# Patient Record
(demographics unavailable — no encounter records)

---

## 2017-09-27 NOTE — CON
DATE OF CONSULTATION:  09/27/2017

 

REASON FOR CONSULTATION:  Tachycardia, increased troponin level.

 

HISTORY OF PRESENT ILLNESS:  Mr. Vance Wing is a gentleman who has been having lightheaded episode
s for 2 days.  Yesterday, he felt lightheaded for a relatively prolonged time at rest, but that reso
lved, but today he noticed when he tried to get up and around, he felt extremely lightheaded.  He we
nt ahead and went to work and he went to go see the nurse, but she was not there.  He [facility] himself 
[facility].  There his blood pressure was slow, they could not measure it and his hea
rt rate was so fast, it could be counted.  He was sent to the emergency room where his heart rate wa
s 220 beats a minute.  He was given adenosine intravenously which has slowed down the rate and looke
d like it probably atrial flutter with 1:1 conduction.  The heart rate went up at least 240 beats a 
minute and later converted to sinus rhythm but did have some transient atrial fibrillation as well.

 

The patient is currently asymptomatic, doing well.

 

The patient states he wants to go home now.

 

He did not have chest pain or tightness with this.

 

PAST MEDICAL HISTORY:  Negative for any cardiac problems other than high blood pressure.  Otherwise,
 the patient has been active and healthy, continuing to work.

 

MEDICATIONS:  In home including hydrochlorothiazide 25 mg a day, metoprolol XL 50 mg a day.

 

Patient does continue to smoke as mentioned.

 

SOCIAL HISTORY:  Positive for smoking.

 

FAMILY HISTORY:  Father had heart disease and coronary disease at a relatively young age.

 

REVIEW OF SYSTEMS:

CONSTITUTIONAL:  No significant weight gain or loss.

VISION:  No changes.

HEARING:  No changes.

PULMONARY:  No cough or wheezing.

GASTROINTESTINAL:  No nausea, vomiting, diarrhea.

SKIN:  No rashes.

NEUROLOGIC:  No unilateral weakness or numbness.

PSYCHIATRIC:  No unusual depression or anxiety.

HEMATOLOGIC:  No unusual bruising.

GENITOURINARY:  No burning with urination.

 

PHYSICAL EXAMINATION:

GENERAL:  He is a pleasant, healthy appearing middle-aged man, in no distress, wants to go home.

VITAL SIGNS:  Blood pressure high 194/95, pulse 58 regular.

LUNGS:  He has some expiratory wheezing.

CARDIAC:  Normal S1, normal S2.

ABDOMEN:  Soft, nontender.

EXTREMITIES:  No clubbing or cyanosis.  There is no edema.

 

PERTINENT LABORATORY:  Creatinine is normal.  Troponin went to a 0.032 to 0.558 to 1.123.

 

EKGs revealed narrow complex tachycardia, one with rate of 220, but it accelerates up to 240 at one 
time, later he had some atrial fibrillation with a rate of 140.

 

ASSESSMENT:

1.  Supraventricular tachycardia, possibly atrial flutter.

2.  Paroxysmal atrial fibrillation.

3.  Echo is normal.

4.  Hypertension.

 

PLAN:

1.  Consulted Dr. Zhong, consideration for an ablation.

2.  We will hold off on beta-blockers due to the wheezing.

3.  Stress testing to be done, hopefully done tomorrow morning.  Hopefully, he can still have the fl
utter ablation in the afternoon.

## 2017-09-27 NOTE — HP
PRIMARY CARE PHYSICIAN:  Edison Virgen M.D.

 

CHIEF COMPLAINT:  Feeling dizzy and sweating.

 

HISTORY OF PRESET ILLNESS:  Mr. Wing is a pleasant 53-year-old gentleman that has a history of hyp
ertension.  He was in his usual state of health until yesterday.  He says that when he got up in the
 morning, he felt dizzy, sweaty, and little bit lightheaded.  He says that he laid down and put a co
ld rag over his head and says after about 5 or 10 minutes, the feeling subsided and he says then he 
got up, got dressed and went to work and had no other problems.  He says then again this morning whe
n he woke up, he once again felt dizzy and a bit lightheaded and felt like his heart was racing.  He
 said he did the same thing, he laid down and tried to be still for a while; however, the feeling elon
bsided, but did not go away completely.  He went ahead and got dressed and went to work, however.  KILO salmon says that when he got to work, he was feeling bad and his symptoms got progressively worse and wor
se.  He went to see the nurse on duty, but she was not there.  He says that he began feeling like he
 was going to pass out, so he drove himself home and he says that he and sounds like his wife went t
o the [facility].  He says that his blood pressure was undetectable, so they sent him t
o the [place] emergency room in [place].  There they did an EKG and found that his heart 
rate was around 220.  They gave him \\"some of medication which was adenosine\\" and says that after
 that he began to feel much better and in fact now, he feels back to his normal self.  During this t
chiquita, he did complain of some tightness in his chest and some soreness and numbness in his left arm. 
 He denies having any shortness of breath, no nausea, or vomiting.  He says that the episode today l
asted about 3 hours.  It is started about 5 a.m. when he got up and continued until about 8 a.m. in 
the morning.

 

REVIEW OF SYSTEM:  Constitutional:  There has been no fever, chills, no night sweats, no weight loss
.  HEENT:  He has had no headache, but he has had dizziness.  No visual changes, no sore throat, rhi
norrhea, neck pain, no adenopathy.  Pulmonary:  No hemoptysis, no cough, no wheezing.  Cardiovascula
r:  As the history of present illness.  Gastrointestinal:  No abdominal pain, no nausea, no vomiting
, no change in bowels.  Genitourinary:  No urinary frequency, hematuria, no hesitancy.  Neurologic: 
 No focal weakness, numbness, no seizures.  Psychiatric:  No symptoms of anxiety or depression.  Ski
n and Integument:  No skin changes.  No rash.

 

PAST MEDICAL HISTORY:  Significant for hypertension.

 

PAST SURGICAL HISTORY:  Negative.

 

ALLERGIES:  No known drug allergies.

 

FAMILY HISTORY:  He says his father has everything \\"including heart disease, diabetes mellitus and
 cerebrovascular disease.\\"

 

SOCIAL HISTORY:  He says he drinks anywhere from 5 to 8 beers a day.  He smokes 2 packs a day since 
he was 20 and therefore, it has been 30 years.  He is  and has 3 children.

 

MEDICATIONS:  Include metoprolol extended release 50 mg daily and hydrochlorothiazide 25 mg a day.

 

PHYSICAL EXAMINATION:

GENERAL:  Currently he is alert and oriented.  He appears to be in no acute distress.

VITAL SIGNS:  Blood pressure is 175/97, heart rate 64, respiratory rate of 18, temperature is 97.8.

HEENT:  Pupils are equal, round, and reactive.  Extraocular muscles are intact.  Sclerae are anicter
ic.  Throat:  No erythema, no exudates.

NECK:  No adenopathy, no bruits.

LUNGS:  Clear to auscultation.  There was no wheezing, no rales.

CARDIOVASCULAR:  He has a normal S1 and S2.  There is no S3 or S4.  No murmurs, clicks, no rubs.

ABDOMEN:  Soft, nontender, nondistended.  Positive for bowel sound.  There is no rebound or guarding
.

EXTREMITIES:  There is no clubbing, cyanosis, no edema.

NEUROLOGIC:  Nonfocal.

 

LABORATORY RESULTS:  White blood cell count is 9.8, hemoglobin 16, hematocrit is 44.3, platelet coun
t is 252.  INR 0.9.  Sodium 138, potassium 4.5, chloride is 101, CO2 is 25, BUN is 18, creatinine 1.
08, glucose is 151, calcium is 10.7.  AST is 38, ALT is 58, and troponin is 0.032 initially and then
 0.558.

 

IMAGING:  EKG initially was appeared to be sinus tachycardia at the rate of 220; after adenosine was
 given, his EKG with atrial fibrillation with a heart rate of 155.  He was placed on Cardizem for a 
short while and is now sinus rhythm at the rate of 68 and he has some nonspecific ST wave changes.

 

ASSESSMENT:

1. This is a 53-year-old gentleman that presented to the emergency room with dizziness and sweating 
as well as some chest tightness.  He was found to be initially supraventricular tachycardia and then
 atrial fibrillation.  It also noted that he has elevated troponin which is more than quadruple sinc
e his admission.  Due to the patient's heavy smoking history and he also has another risk factor inc
luding family history and the fact that he is a male over 50, it is possible that this could be an a
cute coronary syndrome resulting into the arrhythmia or it could be that the patient has a primary a
rrhythmia and causing a demand ischemia; however, at this point it is impossible to tell.  He will b
e admitted and started on nitrates as well as aspirin and will place him on low dose beta blocker.  
Cardiology will be consulted to determine the etiology of his heart disease.  I suspect he will need
 an echo possibly cardiac catheterization as well.

2.  For his tobacco abuse, he has been instructed briefly on smoking cessation.  I offered Wellbutri
n; however, he refused this and says he can take care of this on his own.  Given the fact that he is
 possibly in an acute coronary syndrome, we will hold off on nicotine replacement at this time.

3.  The patient's blood pressure is uncontrolled.  It is unclear whether or not his current blood pr
essure medication is sufficient.  We will continue to monitor his trend and we likely may need to ad
d an additional antihypertensive as well.

## 2017-09-27 NOTE — RAD
SINGLE VIEW OF THE CHEST:

 

Comparison: None.

 

History: Chest pain, lightheadedness. 

 

FINDINGS:

Single view of the chest shows a normal sized cardiomediastinal silhouette. There is no evidence of 
consolidation, mass, or pleural effusion. The bones are unremarkable.

 

IMPRESSION:

No evidence of acute cardiopulmonary disease.

 

POS: [facility]

## 2017-09-28 NOTE — CON
DATE OF CONSULTATION:  09/27/2017

 

ELECTROPHYSIOLOGY CONSULTATION REPORT

 

REFERRING PHYSICIAN:  Vero Tom M.D.

 

I am seeing Mr. Wing at our [facility] telemetry floor as an 
electrophysiology consultant.  His problems are:

1.  Presentation with rapid narrow complex tachycardia with adenosine response 
consistent with typical atrial flutter with intermittent 1:1 conduction.

A.  Short run of atrial fibrillation also seen with subsequent conversion to 
sinus rhythm.

2.  A 2D echo from 09/27/2017 reveals LVEF 50 to 55%.

3.  Elevated troponin up to 1.123 noted.

4.  Coronary artery risk factors.

A.  History of smoking.

 

ALLERGIES:  None noted.

 

MEDICATIONS:  At home including hydrochlorothiazide, metoprolol succinate 50 mg 
daily.

 

SUBJECTIVE:  Mr. Wing is here with severe dizziness, lightheadedness near 
syncope, extreme weakness.  In the ER, he was evaluated and found to be in a 
rapid narrow complex tachycardia, adenosine since I just slowed him down and 
then the tachycardia recurred and underlying flutter waves were observed during 
adenosine administration.  He also did convert to atrial fibrillation, but 
eventually, then the rhythm was terminated.

 

He does not have bleeding issues, no true angina-like symptoms.  No fevers, 
chills, cough, rest of the 12-point review of systems unremarkable.

 

PAST MEDICAL HISTORY:  As above.  No prior history of heart disease or heart 
attacks.  He was treated for hypertension though.

 

SOCIAL HISTORY:  The patient is a smoker.  He denies ETOH or drug use.  He 
works as a manager in a company.

 

FAMILY HISTORY:  Noncontributory.

 

OBJECTIVE DATA:

VITAL SIGNS:  Blood pressure is markedly hypertensive, initially 159/120 mostly 
it is about 191/105.

GENERAL:  He is an alert and oriented man in no apparent distress.

NECK:  Supple.  Jugular veins not distended.

CHEST:  Coarse without crackles.

CARDIOVASCULAR:  Heart sounds are regular to rate and rhythm.  No murmur or 
gallop.

ABDOMEN:  Benign.  Bowel sounds positive.

EXTREMITIES:  Lower extremities without edema, clubbing or cyanosis.  Pulses 
are adequate.

NEUROLOGIC:  The patient is nonfocal.

MUSCULOSKELETAL:  No joint swelling or deformities.

SKIN:  Without rash.

 

DATABASE:  The initial presenting EKG is a rapid narrow complex SVT at rate of 
218 beats per minute.  Underlying flutter waves are visible.  ST depression is 
seen in the inferolateral leads.  Subsequent EKG and rhythm strips during 
adenosine reveal slowing of the heart rates with underlying flutter waves, 
which is likely typical in morphology noted after 6 mg of adenosine.  The 
atrial flutter becomes 2:1, and then rapid rate occurs in 1:1 conduction.  
Variable heart rates are seen.  There is an EKG with variable AV conduction 
with possible underlying flutter wave still present, cannot rule out atrial 
fibrillation at this time.  The fourth EKG reveals sinus rhythm with rate of 68 
beats per minute with nonspecific ST-T changes.

 

LABORATORY DATA:  Sodium 138, potassium 4.5, BUN 18, creatinine is 1.08.  AST 
and ALT is 38 and 58.  Troponin I is 0.32, 0.558 and 1.123.  Globulin is 3.8.  
White count is 9.8, hemoglobin 16, platelet count is 252.

 

ASSESSMENT AND PLAN:  Mr. Wing is a 53-year-old man with a prior history of 
heart disease who presented with lightheadedness, dizziness, and EKG was 
suggestive of atrial flutter with possible 1:1 atrioventricular conduction.  He 
is now back in sinus rhythm and feeling better.  He had a troponin bumped at 
1.23, which is likely due to the fasting rapid heart rates sustaining over 
multiple hours.

 

1.  I discussed the potential treatment options with him.  We discussed the 
options for rhythm medication, rate controlling medications, but he would 
prefer more definitive treatment of his atrial flutter.  Cavotricuspid isthmus 
ablation procedure, risks, benefits were all detailed to him.  We also 
discussed the potential chance for additional flutter circuits in which case 
was still might use some medical therapy versus pulmonary venous isolation 
procedure could be considered as well.  He understands and willing to proceed.

2.  Hypertension markedly elevated, likely initiate ACE inhibitors, possible 
beta blocker therapy.

3.  For now, hold off anticoagulation with recurrent atrial flutter is seen, 
consider anticoagulation as well.  On the other hand, his CHADS VASC score so 
far is zero.

4.  Elevated troponin likely related to the demand ischemia with rapid heart 
rates, further evaluation as per Dr. Tom.

 

We discussed the case with the patient, nurse, and Dr. Tom.

 

COOPER

## 2017-09-28 NOTE — PDOC.PN
- Subjective


Encounter Start Date: 09/28/17


Encounter Start Time: 15:35





Mr. Wing does not have any complaints. He is just returning to his room from 

going to smoke.





- Objective


Resuscitation Status: 


 











Resuscitation Status           FULL:Full Resuscitation














MAR Reviewed: Yes


Vital Signs & Weight: 


 Vital Signs (12 hours)











  Temp Pulse Resp BP BP BP Pulse Ox


 


 09/28/17 15:05  97.7 F  101 H  16    154/90 H 


 


 09/28/17 12:54  96.7 F L  60  16   180/92 H   99


 


 09/28/17 12:52   73   190/103 H   


 


 09/28/17 08:00  98.6 F  91  18     96


 


 09/28/17 07:19  98.6 F  91  18    135/102 H  96


 


 09/28/17 04:00  97.8 F  75  18   137/82   95








 Weight











Weight                         206 lb 7 oz














I&O: 


 











 09/27/17 09/28/17 09/29/17





 06:59 06:59 06:59


 


Intake Total  240 


 


Output Total  465 


 


Balance  -225 











Result Diagrams: 


 09/28/17 05:24





 09/28/17 05:24





Phys Exam





- Physical Examination


HEENT: PERRLA


Respiratory: no wheezing, no rales, no rhonchi, clear to auscultation bilateral


Cardiovascular: RRR, no significant murmur, no rub


Gastrointestinal: soft, non-tender, positive bowel sounds


Musculoskeletal: no edema





Dx/Plan


(1) Supraventricular tachycardia


Code(s): I47.1 - SUPRAVENTRICULAR TACHYCARDIA   Status: Acute   





(2) Tobacco abuse


Code(s): Z72.0 - TOBACCO USE   Status: Acute   





(3) Hypertension


Code(s): I10 - ESSENTIAL (PRIMARY) HYPERTENSION   Status: Acute   





- Plan





* SVT- patient is s/p ablation- patient has been in sinus, but his heart rate 

has begun to climb a little


* HTN- blood pressure has been elevated- Amlodipine was added by Cardiology


* Discussed smoking cessation again, patient is not yet ready to quit


* Monitor in the hospital overnight


* Likely home tomorrow.

## 2017-09-28 NOTE — CCL
REFERRING PHYSICIAN:

Steve Tom M.D.

 

REASON FOR PROCEDURE:

The patient is a 53-year-old man with no prior cardiac history. History of 
smoking who presented with a rapid narrow complex tachycardia with adenosine 
revealing a typical atrial flutter waves underlying eventually terminated on 
diltiazem drip.  He also had different cycle length tachycardia noted at times, 
but they are not as rapid as the atrial flutter which was to be 1:1 conducted. 
Here for cavotricuspid isthmus ablation.

 

PROCEDURE:  

The patient received propofol for deep sedation.  After adequate level of 
sedation achieved, a right femoral vein was prepped and  draped and 
anesthetized using subcutaneous lidocaine and with a multipurpose needle and a 
vein was cannulated x2.  Two 8 Yoruba sheaths were introduced through which a 7 
Yoruba decapolar catheter was advanced to the right ventricle and right atrium, 
and eventually CS position.  The basic EP study was obtained with these 
catheters.  Measurements were as follows:  Baseline rhythm is sinus rhythm, AK 
135, QRS 90, , , HV 40 milliseconds.

 

The patient was in sinus rhythm at baseline. Sinus node recovery time corrected 
was 224.  The AV Wenckebach cycle is 240. Retrograde VA block cycle length was 
400.  No evidence of accessory pathway was demonstrated.  The rapid atrial 
pacing, we were able to introduce atrial flutter at cycle length of 240 
milliseconds.  The  pacing demonstrated post-pacing interval close to the 
tachycardia cycle length.  With further pacing,  the typical appearing atrial 
flutter Disintegrated to atrial fibrillation. Amiodarone 150 mg was given and 
the procedure of the cavotricuspid isthmus ablation.  During ablation, the 
rhythm terminated and sinus rhythm ensued.  With CS 9/10 pacing, we  achieved 
complete cavotricuspid isthmus block as demonstrated by progressively 
shortening transisthmus time measurements from the lateral side of the line to 
the lateral left atrium measured.  The longest transisthmus times by the 
ablation line is 160 milliseconds.  Following that, the induction of the atrial 
rhythm was attempted. No sustained atrial arrhythmia was induced.

 

The cavotricuspid isthmus block persisted after IV dopamine challenge as well.

 

CONCLUSION:

1.  Successful induction of typical atrial flutter.

2.  Successful cavotricuspid isthmus ablation eliminating the inducibility of 
the atrial flutter.

2.  Persistent atrial fibrillation responding to a single IV amiodarone bolus 
was also seen.

 

PLAN:  

Continue rate controlling agents. Consider anticoagulation if recurrent atrial 
fibrillation is seen.  Also, consider Multaq  or flecainide therapy for atrial 
fibrillation suppression.

MTDD

## 2017-09-29 NOTE — PRG
DATE OF SERVICE:  09/29/2017

 

HISTORY:  Mr. Wing he is doing okay today.  He already is outside smoking.  This was before 7:00 a
.m.  No chest pain or pressure.  Feels well.

 

PHYSICAL EXAMINATION:

VITAL SIGNS:  Blood pressure is high 185/99, pulse 73.

LUNGS:  Clear.

CARDIAC:  Normal S1 and S2.

 

ASSESSMENT:

1.  Status post ablation for atrial flutter.

2.  Paroxysmal atrial fibrillation, CHADS-VASc score 1.

3.  Hypertension, uncontrolled.

4.  Continued tobacco dependence.

 

PLAN:

1.  Increase amlodipine to 10 mg daily.  

2.  Continue metoprolol 50 mg a day.

3.  At this time anticoagulation will not be instituted.  The patient will monitor his pulse, he marj
l get a pulse oximeter and see if he has any evidence of rapid heart rate.  The patient with CHADS V
ASc 1, it is not clear that the risk of anticoagulation would justify the potential benefit.

4.  Will probably need further blood pressure medicine.  I suspect he has been hypertensive for quit
e some time.  He will come back in the office and see me in a few weeks, maybe need to add ACE inhib
itor or angiotensin receptor blocker if hypertension persists.  At some point would like to do stres
s testing.  He did have a non-ST elevation infarction with a peak troponin of 1.1, which appeared to
 be almost certainly demand ischemia.  He had prolonged episode of tachycardia over 200 beats a douglas
te for a couple of hours.

5.  Hyperlipidemia with cholesterol 238, triglyceride 169, .  In the setting of smoking, at s
ome point, I would recommend statin therapy, I actually will go ahead and institute that now.  There
 does appear to be some question of compliance; however, hopefully the patient will take medicine.  
I think the biggest problem now is blood pressure control.  We will add Rosuvastatin.

## 2017-09-29 NOTE — DIS
DATE OF ADMISSION:  09/27/2017

 

DATE OF DISCHARGE:  09/29/2017

 

PRIMARY CARE PHYSICIAN:  Edison Virgen M.D.

 

DISCHARGE DISPOSITION:  Home.

 

PRIMARY DISCHARGE DIAGNOSES:

1.  Supraventricular tachycardia.

2.  Non-ST segment elevated myocardial infarction type 2 secondary to demand ischemia.

3.  Hypertension.

4.  Tobacco abuse.

 

DISCHARGE MEDICATIONS:  Include metoprolol XL 50 mg daily, hydrochlorothiazide 25 mg daily, aspirin 
81 mg daily, Crestor 20 mg at bedtime, and amlodipine 10 mg daily.

 

PROCEDURES DONE DURING ADMISSION:  The patient had an echocardiogram in which the ejection fraction 
was estimated at 50%-55%.  Left ventricular size was normal.  There was no evidence of any valvular 
disease.  The patient had an EP study done with successful induction of typical atrial flutter and a
 successful cavotricuspid isthmus ablation eliminating the atrial flutter.

 

CODE STATUS:  FULL CODE.

 

ALLERGIES:  No known drug allergies.

 

HOSPITAL COURSE:  Mr. Wing is a pleasant 53-year-old gentleman who presented to the emergency room
 with complaints of feeling dizzy and lightheaded, sweaty as well as having some chest tightness as 
well.  He was seen at [facility] where he is found to be in supraventricular tac
hycardia with heart rate of 220.  He was given adenosine and then placed on Cardizem where he conver
forest back to sinus rhythm.  He was seen by Cardiology, had an echocardiogram which was negative and w
as evaluated by Electrophysiology where he made the decision to undergo ablation.  Medication was of
fered to the patient, but he refused this.  The patient underwent ablation and had an uneventful pos
toperative course and was subsequently able to be discharged home in stable condition.  He was couns
eled extensively on smoking cessation as he has a history of heavy smoking abuse or tobacco abuse, b
ut says that he was not quite ready to quit and his job offered him counseling and supplies that he 
requested.